# Patient Record
Sex: MALE | Race: OTHER | Employment: OTHER | URBAN - METROPOLITAN AREA
[De-identification: names, ages, dates, MRNs, and addresses within clinical notes are randomized per-mention and may not be internally consistent; named-entity substitution may affect disease eponyms.]

---

## 2024-04-18 ENCOUNTER — HOSPITAL ENCOUNTER (EMERGENCY)
Facility: HOSPITAL | Age: 29
Discharge: HOME | End: 2024-04-18
Attending: EMERGENCY MEDICINE

## 2024-04-18 VITALS
OXYGEN SATURATION: 98 % | TEMPERATURE: 98.4 F | DIASTOLIC BLOOD PRESSURE: 74 MMHG | SYSTOLIC BLOOD PRESSURE: 125 MMHG | RESPIRATION RATE: 18 BRPM | HEART RATE: 74 BPM

## 2024-04-18 DIAGNOSIS — S05.02XA ABRASION OF LEFT CORNEA, INITIAL ENCOUNTER: Primary | ICD-10-CM

## 2024-04-18 PROCEDURE — 99283 EMERGENCY DEPT VISIT LOW MDM: CPT

## 2024-04-18 RX ORDER — LEVOFLOXACIN 5 MG/ML
SOLUTION/ DROPS TOPICAL
Qty: 5 ML | Refills: 0 | Status: SHIPPED | OUTPATIENT
Start: 2024-04-18

## 2024-04-18 ASSESSMENT — LIFESTYLE VARIABLES
HOW OFTEN DO YOU HAVE A DRINK CONTAINING ALCOHOL: NEVER
HOW MANY STANDARD DRINKS CONTAINING ALCOHOL DO YOU HAVE ON A TYPICAL DAY: PATIENT DOES NOT DRINK

## 2024-04-18 ASSESSMENT — PAIN - FUNCTIONAL ASSESSMENT: PAIN_FUNCTIONAL_ASSESSMENT: NONE - DENIES PAIN

## 2024-04-18 NOTE — ED PROVIDER NOTES
LABS:  Labs Reviewed - No data to display    All other labs were within normal range or not returned as of this dictation.    EMERGENCY DEPARTMENT COURSE and DIFFERENTIAL DIAGNOSIS/MDM:   Vitals:    Vitals:    04/18/24 1443   BP: 125/74   Pulse: 74   Resp: 18   Temp: 98.4 °F (36.9 °C)   SpO2: 98%           Medical Decision Making  29-year-old male presents with complaint of left eye injury.  Patient is well-appearing, nontoxic and with normal vital signs.  Visual acuity intact.  Fluorescein staining performed which noted a corneal abrasion.  Negative galo sign.  Patient is otherwise well-appearing without evidence of retained foreign body, corneal ulcer, globe rupture, or superimposed infection.  Patient is safe for discharge home at this time.  Will provide with topical antibiotic drops.  Recommend ibuprofen/Tylenol as needed for the pain at home.  Discussed with patient the importance of not wearing contacts until treatment is complete.  Return precautions discussed with patient who expresses understanding and is in agreement with the current plan.  Recommend patient call opthalmology as soon as possible for close follow-up.  Referral provided.    Risk  Prescription drug management.              FINAL IMPRESSION      1. Abrasion of left cornea, initial encounter          DISPOSITION/PLAN   DISPOSITION Decision To Discharge 04/18/2024 03:33:31 PM      PATIENT REFERRED TO:  [facility] [place] [facility]  [id number]  [place] [address]  [phone]    As needed, If symptoms worsen    Your PCP    In 2 days      [facility]  [address] [address] [facility]  [address]  [place] [address]  [phone]  Schedule an appointment as soon as possible for a visit         DISCHARGE MEDICATIONS:  New Prescriptions    LEVOFLOXACIN (QUIXIN) 0.5 % OPHTHALMIC SOLUTION    2 drops ever 2 hours for 2 days THEN 2 drops every 6 hours for 5 days         (Please note that portions of this note were completed with a voice

## 2024-04-18 NOTE — ED TRIAGE NOTES
Pt arrives to the ED ambulatory and stable with c/o of a possible foreign body in left eye. 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